# Patient Record
Sex: FEMALE
[De-identification: names, ages, dates, MRNs, and addresses within clinical notes are randomized per-mention and may not be internally consistent; named-entity substitution may affect disease eponyms.]

---

## 2021-09-01 ENCOUNTER — NURSE TRIAGE (OUTPATIENT)
Dept: OTHER | Facility: CLINIC | Age: 31
End: 2021-09-01

## 2023-01-05 NOTE — TELEPHONE ENCOUNTER
Brief description of triage: Pt believes she may have gotten bit by some sort of insect on Sunday. One bit is about pencil eraser size and has redness about dime size. It itchy and will slightly hurt if she bumps it. No fever. Triage indicates for patient to see today or tomorrow in office. Care advice provided, patient verbalizes understanding; denies any other questions or concerns; instructed to call back for any new or worsening symptoms. This triage is a result of a call to [address] [place] [address]. Please do not respond to the triage nurse through this encounter. Any subsequent communication should be directly with the patient. Reason for Disposition   Patient wants to be seen    Answer Assessment - Initial Assessment Questions  1. TYPE of INSECT: \"What type of insect was it? \"       Unsure    2. ONSET: \"When did you get bitten? \"   Started on Sunday after pulling weeds    3. LOCATION: \"Where is the insect bite located? \"   Left wrist    4. REDNESS: \"Is the area red or pink? \" If so, ask \"What size is area of redness? \" (inches or cm). \"When did the redness start? \"    One is size of pencil eraser is raised and has redness around it-smaller than dime for the redness  The other bites are small bumps    5. PAIN: \"Is there any pain? \" If so, ask: \"How bad is it? \"  (Scale 1-10; or mild, moderate, severe)      If bumps it can tell its there    6. ITCHING: \"Does it itch? \" If so, ask: \"How bad is the itch? \"     - MILD: doesn't interfere with normal activities    - MODERATE-SEVERE: interferes with work, school, sleep, or other activities     Is itching    7. SWELLING: \"How big is the swelling? \" (inches, cm, or compare to coins)     No other swelling    8. OTHER SYMPTOMS: \"Do you have any other symptoms? \"  (e.g., difficulty breathing, hives)     No hives, no difficulty breathing, no swollen tongue    9. PREGNANCY: \"Is there any chance you are pregnant? \" \"When was your last menstrual Increase fluids and salt intake  Midodrine twice a day  Track blood pressure readings

## 2024-10-18 ENCOUNTER — OFFICE VISIT (OUTPATIENT)
Dept: URGENT CARE | Age: 34
End: 2024-10-18
Payer: COMMERCIAL

## 2024-10-18 VITALS
WEIGHT: 215 LBS | OXYGEN SATURATION: 99 % | HEART RATE: 68 BPM | SYSTOLIC BLOOD PRESSURE: 131 MMHG | TEMPERATURE: 97.8 F | RESPIRATION RATE: 16 BRPM | DIASTOLIC BLOOD PRESSURE: 86 MMHG

## 2024-10-18 DIAGNOSIS — R35.0 FREQUENT URINATION: ICD-10-CM

## 2024-10-18 LAB
POC APPEARANCE, URINE: CLEAR
POC BILIRUBIN, URINE: NEGATIVE
POC BLOOD, URINE: NEGATIVE
POC COLOR, URINE: YELLOW
POC GLUCOSE, URINE: NEGATIVE MG/DL
POC KETONES, URINE: NEGATIVE MG/DL
POC LEUKOCYTES, URINE: NEGATIVE
POC NITRITE,URINE: NEGATIVE
POC PH, URINE: 6 PH
POC PROTEIN, URINE: NEGATIVE MG/DL
POC SPECIFIC GRAVITY, URINE: <=1.005
POC UROBILINOGEN, URINE: 0.2 EU/DL
PREGNANCY TEST URINE, POC: NEGATIVE

## 2024-10-18 PROCEDURE — 87086 URINE CULTURE/COLONY COUNT: CPT

## 2024-10-18 RX ORDER — NITROFURANTOIN 25; 75 MG/1; MG/1
100 CAPSULE ORAL 2 TIMES DAILY
Qty: 14 CAPSULE | Refills: 0 | Status: SHIPPED | OUTPATIENT
Start: 2024-10-18 | End: 2024-10-25

## 2024-10-18 ASSESSMENT — ENCOUNTER SYMPTOMS
CARDIOVASCULAR NEGATIVE: 1
CONSTITUTIONAL NEGATIVE: 1
GASTROINTESTINAL NEGATIVE: 1
HEMATURIA: 0
FLANK PAIN: 1
RESPIRATORY NEGATIVE: 1
FREQUENCY: 1
DYSURIA: 0
BACK PAIN: 1

## 2024-10-18 ASSESSMENT — PAIN SCALES - GENERAL: PAINLEVEL_OUTOF10: 4

## 2024-10-18 NOTE — PROGRESS NOTES
Subjective   Patient ID: Steffany Arredondo is a 33 y.o. female. They present today with a chief complaint of Back Pain (Left sided lower back pain, frequent urination X 1 wk. ).    History of Present Illness    Back Pain  Pertinent negatives include no dysuria or pelvic pain.     Patient presents to urgent care for a chief complaint of concern of urinary tract infection as patient complains of urinary frequency urgency feeling of incomplete void, denies dysuria denies any vaginal pain or discharge over the last week also complains of left sided flank pain  Past Medical History  Allergies as of 10/18/2024    (No Known Allergies)       (Not in a hospital admission)       Past Medical History:   Diagnosis Date    Personal history of diseases of the skin and subcutaneous tissue     History of eczema    Personal history of other infectious and parasitic diseases     History of chickenpox       Past Surgical History:   Procedure Laterality Date    OTHER SURGICAL HISTORY  08/23/2019    Anterior cruciate ligament repair            Review of Systems  Review of Systems   Constitutional: Negative.    HENT: Negative.     Respiratory: Negative.     Cardiovascular: Negative.    Gastrointestinal: Negative.    Genitourinary:  Positive for flank pain, frequency and urgency. Negative for dysuria, hematuria, menstrual problem, pelvic pain, vaginal bleeding, vaginal discharge and vaginal pain.   Musculoskeletal:  Positive for back pain.                                  Objective    Vitals:    10/18/24 1545   BP: 131/86   Pulse: 68   Resp: 16   Temp: 36.6 °C (97.8 °F)   SpO2: 99%   Weight: 97.5 kg (215 lb)     No LMP recorded.    Physical Exam    Procedures    Point of Care Test & Imaging Results from this visit  Results for orders placed or performed in visit on 10/18/24   POCT pregnancy, urine manually resulted   Result Value Ref Range    Preg Test, Ur Negative Negative   POCT UA Automated manually resulted   Result Value Ref Range    POC  Color, Urine Yellow Straw, Yellow, Light-Yellow    POC Appearance, Urine Clear Clear    POC Glucose, Urine NEGATIVE NEGATIVE mg/dl    POC Bilirubin, Urine NEGATIVE NEGATIVE    POC Ketones, Urine NEGATIVE NEGATIVE mg/dl    POC Specific Gravity, Urine <=1.005 1.005 - 1.035    POC Blood, Urine NEGATIVE NEGATIVE    POC PH, Urine 6.0 No Reference Range Established PH    POC Protein, Urine NEGATIVE NEGATIVE, 30 (1+) mg/dl    POC Urobilinogen, Urine 0.2 0.2, 1.0 EU/DL    Poc Nitrite, Urine NEGATIVE NEGATIVE    POC Leukocytes, Urine NEGATIVE NEGATIVE      No results found.    Diagnostic study results (if any) were reviewed by Russell Noble PA-C.    Assessment/Plan   Allergies, medications, history, and pertinent labs/EKGs/Imaging reviewed by Russell Noble PA-C.     Medical Decision Making  I did discuss with patient that urinalysis not truly indicative of urinary tract infection I am agreeable to place patient on Macrobid, urine sent for culture.  I did advise patient if any increase in flank pain, any development of fevers chills, development of severe back pain or abdominal pain patient is to go to the emergency room I also did discuss with patient possibility of kidney stone.  Patient verbalized understanding agreeable to plan discharge from urgent care A+O x 4 stable condition no signs of distress    Orders and Diagnoses  Diagnoses and all orders for this visit:  Frequent urination  -     POCT pregnancy, urine manually resulted  -     POCT UA Automated manually resulted  -     Urine Culture      Medical Admin Record      Patient disposition: Home    Electronically signed by Russell Noble PA-C  4:19 PM

## 2024-10-20 LAB — BACTERIA UR CULT: NORMAL
